# Patient Record
(demographics unavailable — no encounter records)

---

## 2025-03-23 NOTE — HISTORY OF PRESENT ILLNESS
[de-identified] : DORINDA COLLINS is a 35 y/o F here for follow up of right mastalgia and birads 3 findings.  Referred by: Dr. Nessa Lee (PCP)   24 (Initial consult) Right breast pain started 2022, workup benign, pain self-resolved.   Right pain returned a few months ago, eventually causing significant pain for her to go to ED, ultrasound benign.  She saw a breast surgeon at [facility], Dr. Anabela Kingston, who advised that the breast cyst was benign appearing.    She states the pain is constant, diffuse and intense.  The pain started in the right breast and now affects the left breast.   Reports right breast milky nipple discharge x 1, during self-expression.   Drinks 2 cups coffee/ day.  3/18/25 Presents for follow up. Recent B/L US rated birads 2 for small Right 10N5 and left 12N3 sub-cm cysts.  No breast complaints.   PMHx:  none PSHx: tummy tuck  Meds:  none NKDA Family Hx:  no fam hx of breast or ovarian cancer.  GYN: , menarche age 12, LMP 2024. Age at first pregnancy- 22  - no . h/o hormone replacement - no.    previously on OCPs - stopped in 2018.  Bra size:  34D Occupation:  (office job) Social: Smoking:  no         ETOH: social

## 2025-03-23 NOTE — RESULTS/DATA
[FreeTextEntry1] : BREAST PATH/RAD REVIEW.  [facility].  4/19/24 BL Dx MG/US: birads 2. Extremely dense. R8N6 area of palp concern w/o any findings. R 8N3 0.7 cm complicated cyst. (Rec clinical correlation/ assessment).   [facility].  5/6/24 BL US: birads 3.  - R 10N1 1.4 cm probable fat lobule. - R 9N6 region of reported palp concern w/o any suspicious abnormalities. (Rec clinical f/u of R breast pain)  - R 6N1 1.3 cm oval mass (Rec R Dx US).  - R 8N3 prior finding not seen, c/w cystic changes.  - L 1N6 0.5 cm prob FCC.   12/23/24 B/L US: birads 2. R 10N5 6 mm small cyst. L 12N3 5 mm small cyst. No axillary LAD.

## 2025-03-23 NOTE — PHYSICAL EXAM
[Normocephalic] : normocephalic [Atraumatic] : atraumatic [Supple] : supple [No Supraclavicular Adenopathy] : no supraclavicular adenopathy [No Cervical Adenopathy] : no cervical adenopathy [Examined in the supine and seated position] : examined in the supine and seated position [Symmetrical] : symmetrical [No Nipple Discharge] : no left nipple discharge [No Axillary Lymphadenopathy] : no left axillary lymphadenopathy [de-identified] : Normal respiratory effort [de-identified] : no palpable masses in either breast.  no clinical lymphadenopathy

## 2025-03-23 NOTE — HISTORY OF PRESENT ILLNESS
[de-identified] : DORINDA COLLINS is a 35 y/o F here for follow up of right mastalgia and birads 3 findings.  Referred by: Dr. Nessa Lee (PCP)   24 (Initial consult) Right breast pain started 2022, workup benign, pain self-resolved.   Right pain returned a few months ago, eventually causing significant pain for her to go to ED, ultrasound benign.  She saw a breast surgeon at [facility], Dr. Anabela Kingston, who advised that the breast cyst was benign appearing.    She states the pain is constant, diffuse and intense.  The pain started in the right breast and now affects the left breast.   Reports right breast milky nipple discharge x 1, during self-expression.   Drinks 2 cups coffee/ day.  3/18/25 Presents for follow up. Recent B/L US rated birads 2 for small Right 10N5 and left 12N3 sub-cm cysts.  No breast complaints.   PMHx:  none PSHx: tummy tuck  Meds:  none NKDA Family Hx:  no fam hx of breast or ovarian cancer.  GYN: , menarche age 12, LMP 2024. Age at first pregnancy- 22  - no . h/o hormone replacement - no.    previously on OCPs - stopped in 2018.  Bra size:  34D Occupation:  (office job) Social: Smoking:  no         ETOH: social

## 2025-03-23 NOTE — PHYSICAL EXAM
[Normocephalic] : normocephalic [Atraumatic] : atraumatic [Supple] : supple [No Supraclavicular Adenopathy] : no supraclavicular adenopathy [No Cervical Adenopathy] : no cervical adenopathy [Examined in the supine and seated position] : examined in the supine and seated position [Symmetrical] : symmetrical [No Nipple Discharge] : no left nipple discharge [No Axillary Lymphadenopathy] : no left axillary lymphadenopathy [de-identified] : Normal respiratory effort [de-identified] : no palpable masses in either breast.  no clinical lymphadenopathy

## 2025-03-23 NOTE — RESULTS/DATA
[FreeTextEntry1] : BREAST PATH/RAD REVIEW.  [facility].  4/19/24 BL Dx MG/US: birads 2. Extremely dense. R8N6 area of palp concern w/o any findings. R 8N3 0.7 cm complicated cyst. (Rec clinical correlation/ assessment).   [facility].  5/6/24 BL US: birads 3.  - R 10N1 1.4 cm probable fat lobule. - R 9N6 region of reported palp concern w/o any suspicious abnormalities. (Rec clinical f/u of R breast pain)  - R 6N1 1.3 cm oval mass (Rec R Dx US).  - R 8N3 prior finding not seen, c/w cystic changes.  - L 1N6 0.5 cm prob FCC.   12/23/24 B/L US: birads 2. R 10N5 6 mm small cyst. L 12N3 5 mm small cyst. No axillary LAD.

## 2025-03-23 NOTE — PHYSICAL EXAM
[Normocephalic] : normocephalic [Atraumatic] : atraumatic [Supple] : supple [No Supraclavicular Adenopathy] : no supraclavicular adenopathy [No Cervical Adenopathy] : no cervical adenopathy [Examined in the supine and seated position] : examined in the supine and seated position [Symmetrical] : symmetrical [No Nipple Discharge] : no left nipple discharge [No Axillary Lymphadenopathy] : no left axillary lymphadenopathy [de-identified] : Normal respiratory effort [de-identified] : no palpable masses in either breast.  no clinical lymphadenopathy

## 2025-03-23 NOTE — HISTORY OF PRESENT ILLNESS
[de-identified] : DORINDA COLLINS is a 37 y/o F here for follow up of right mastalgia and birads 3 findings.  Referred by: Dr. Nessa Lee (PCP)   24 (Initial consult) Right breast pain started 2022, workup benign, pain self-resolved.   Right pain returned a few months ago, eventually causing significant pain for her to go to ED, ultrasound benign.  She saw a breast surgeon at [facility], Dr. Anabela Kingston, who advised that the breast cyst was benign appearing.    She states the pain is constant, diffuse and intense.  The pain started in the right breast and now affects the left breast.   Reports right breast milky nipple discharge x 1, during self-expression.   Drinks 2 cups coffee/ day.  3/18/25 Presents for follow up. Recent B/L US rated birads 2 for small Right 10N5 and left 12N3 sub-cm cysts.  No breast complaints.   PMHx:  none PSHx: tummy tuck  Meds:  none NKDA Family Hx:  no fam hx of breast or ovarian cancer.  GYN: , menarche age 12, LMP 2024. Age at first pregnancy- 22  - no . h/o hormone replacement - no.    previously on OCPs - stopped in 2018.  Bra size:  34D Occupation:  (office job) Social: Smoking:  no         ETOH: social

## 2025-03-23 NOTE — RESULTS/DATA
[FreeTextEntry1] : BREAST PATH/RAD REVIEW.  [facility].  4/19/24 BL Dx MG/US: birads 2. Extremely dense. R8N6 area of palp concern w/o any findings. R 8N3 0.7 cm complicated cyst. (Rec clinical correlation/ assessment).   [facility].  5/6/24 BL US: birads 3.  - R 10N1 1.4 cm probable fat lobule. - R 9N6 region of reported palp concern w/o any suspicious abnormalities. (Rec clinical f/u of R breast pain)  - R 6N1 1.3 cm oval mass (Rec R Dx US).  - R 8N3 prior finding not seen, c/w cystic changes.  - L 1N6 0.5 cm prob FCC.   12/23/24 B/L US: birads 2. R 10N5 6 mm small cyst. L 12N3 5 mm small cyst. No axillary LAD.

## 2025-03-23 NOTE — ASSESSMENT
[FreeTextEntry1] : DORINDA COLLINS is a 35 y/o F here for follow up of right mastalgia and birads 3 findings.    We discussed her clinical breast exam today is unremarkable with no clinical evidence of disease. I reviewed her most recent breast imaging with no significant findings.   She will resume her annual breast imaging surveillance with her PCP/GYN, and she will return to see me if any breast imaging abnormalities or breast concerns arise.